# Patient Record
Sex: FEMALE | Race: OTHER | NOT HISPANIC OR LATINO
[De-identification: names, ages, dates, MRNs, and addresses within clinical notes are randomized per-mention and may not be internally consistent; named-entity substitution may affect disease eponyms.]

---

## 2022-02-28 ENCOUNTER — FORM ENCOUNTER (OUTPATIENT)
Age: 27
End: 2022-02-28

## 2022-04-14 PROBLEM — Z00.00 ENCOUNTER FOR PREVENTIVE HEALTH EXAMINATION: Status: ACTIVE | Noted: 2022-04-14

## 2022-05-11 ENCOUNTER — APPOINTMENT (OUTPATIENT)
Age: 27
End: 2022-05-11

## 2022-05-11 ENCOUNTER — APPOINTMENT (OUTPATIENT)
Dept: HEMATOLOGY ONCOLOGY | Facility: CLINIC | Age: 27
End: 2022-05-11

## 2022-05-11 ENCOUNTER — NON-APPOINTMENT (OUTPATIENT)
Age: 27
End: 2022-05-11

## 2022-05-18 ENCOUNTER — FORM ENCOUNTER (OUTPATIENT)
Age: 27
End: 2022-05-18

## 2022-06-09 ENCOUNTER — APPOINTMENT (OUTPATIENT)
Dept: HEMATOLOGY ONCOLOGY | Facility: CLINIC | Age: 27
End: 2022-06-09

## 2022-06-13 ENCOUNTER — NON-APPOINTMENT (OUTPATIENT)
Age: 27
End: 2022-06-13

## 2022-06-13 NOTE — DISCUSSION/SUMMARY
[FreeTextEntry1] : The visit was provided via telehealth using real-time 2-way audio visual technology. The patient, Mitzi Mitchell, was located in [place], [place] at the time of the visit. The provider, Mirian Black (genetic counselor) participated in the telehealth encounter. The genetic counselor was located at the medical office at [facility] in [place], [place] at the time of the appointment. Verbal consent for telehealth services was given by the patient. The patient was unaccompanied.\par \par REASON FOR VISIT:\par Ms. Mitzi Mitchell is a 27-year-old female who was seen for cancer genetic counseling and risk assessment due to a positive pathogenic  The patient was seen on 2022 through the [facility] at [facility]. The patient was unaccompanied. \par \par RELEVANT MEDICAL AND SURGICAL HISTORY:\par The patient has no personal history of cancer. Patient reported she was bleeding abnormally and was offered testing.\par \par OTHER MEDICAL AND SURGICAL HISTORY:\par • None \par \par PAST OB/GYN HISTORY:\par Obstetrical History:  (1 TOP)\par Age at Menarche: 14\par Pre-Menopausal: irregular\par Age at First Live Birth: N/A\par Oral Contraceptive Use: For a few months at age 18\par Hormone Replacement Therapy: None\par \par CANCER SCREENING HISTORY: \par BREAST: Clinical breast exam a few months ago.\par GYN: Last visit was a few months ago. Frequency: annual. Patient reported no abnormalities.\par Colon: None.\par Skin: None.\par \par SOCIAL HISTORY:\par • Tobacco-product use: None. \par \par FAMILY HISTORY:\par Paternal ancestry was reported as Serbian/Sindy and maternal ancestry was reported as Ivorian/AJ. A detailed family history of cancer was ascertained, see below for pedigree. Of note:\par • Mother with breast cancer at 52\par \par No known FHx of colorectal or uterine cancer\par \par The remaining family history is unremarkable. According to the patient there are no other known cases of significant cancers in the family. To her knowledge no one else in the family has had germline testing for cancer susceptibility. \par \par TEST RESULTS: POSITIVE \par This patient was tested by Dr. 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 Velázquez Syndrome is an autosomal dominant hereditary cancer predisposition syndrome associated with increased risks of primarily colorectal and endometrial cancer as well as additional other cancers. \par \par Colorectal cancer risk:\par The risk to develop colorectal cancer to age 80 for individuals with a pathogenic PMS2 mutation is estimated to be approximately 8.7-20% compared to the general population risk of approximately 4.2%, with a mean age of onset of 61-66 years\par 	\par Endometrial cancer risk:\par The risk to develop endometrial cancer is estimated to be approximately 13-26% compared to the general population risk of 3.1%, with a mean age of onset of 49-50 years.\par \par Ovarian cancer risk:\par The risk to develop ovarian cancer is estimated to be approximately 3% compared to the general population risk of 1.3%, with a mean age of onset of 51-59 years\par \par Other cancer risks:\par Increased risks of gastric, pancreatic, bladder, biliary tract, urothelial, small bowel, prostate, brain/CNS, and breast cancer, and skin neoplasms/lesions (i.e. sebaceous adenoma, sebaceous carcinomas, and keratoacanthomas) are within the LS spectrum, however studies specific to LS have not reported increased cumulative risks for PMS2 carriers at this time.\par \par PATIENT MANAGEMENT IMPLICATIONS:\par The consult was initially intended to be a collaborative appointment with the patient, genetic counselor and Dr. Vick Ruiz, however, due to time restraint, the appointment was only able to be partially completed with the patient and the genetic counselor. We reviewed the general screening recommendations for patients with PMS2-related Velázquez syndrome. A follow-up collaborative appointment will be rescheduled with the patient, Dr. Ruiz and the genetic counselor where the recommendations for this patient will be discussed and a screening plan will be created.\par \par REPRODUCTIVE RISKS:\par We also discussed the reproductive risks associated with pathogenic PMS2 mutations. Please note, individuals with biallelic (two) mutations in the PMS2 gene have been reported to have Constitutional Mismatch Repair Deficiency (CMMRD). CMMRD is an autosomal recessive condition characterized by early-onset cancer, usually in childhood or young adult. The most common cancers are brain/CNS tumors, gastrointestinal malignancies, and hematological malignancies. For those of reproductive age, we recommend the reproductive partner of an individual who is a PMS2 carrier also have PMS2 genetic testing to assess the risk of having a child affected with CMMRD. If both individuals are carriers of PMS2 mutations, there is a 25% chance to have a child with CMMRD for each pregnancy.\par \par Since the genetic mutation is known, pre-implantation genetic diagnosis (PGD) is possible. PGD and prenatal diagnosis were discussed briefly. The patient was provided the contact information for the reproductive genetic counselor and the fertility clinic to discuss their options.\par \par IMPLICATIONS FOR FAMILY MEMBERS:\par This mutation is inherited in an autosomal dominant pattern. We recommend the patient’s relatives, specifically her daughter, mother and sister pursue genetic counseling and genetic testing as there is a chance they also have the same mutation. The patient was made aware that if any at-risk relatives wanted to pursue genetic testing any time in the future, we would be happy to see them and coordinate testing. \par \par VARIANT OF UNCERTAIN SIGNIFICANCE:\par We also discussed the patient's uncertain result in the GALNT12 gene. At this time the available evidence is insufficient to determine the role of this variant in disease and the clinical significance of these results are uncertain. The GALNT12 gene currently has no well-established disease association; however, there is preliminary evidence supporting a correlation with autosomal dominant predisposition to colorectal cancer. \par \par The detection of this VUS does NOT currently change the patient’s medical management. It is NOT recommended at this time that family members use this result for predictive genetic testing or medical management decisions. With more research, a VUS may be reclassified as either disease-causing or benign. The patient was encouraged to contact us every 2-3 years to enquire about any new information for this variant, or sooner if there are any changes in her personal or family history of cancer.  Such updates could possibly change our risk assessment and recommendations. \par \par SUMMARY:\par We informed the patient that our knowledge of genetics and inherited cancer conditions is changing rapidly. Therefore, we recommended that the patient contact our office, every 2 to 3 years, to discuss relevant advances in cancer genetics.  We emphasized the importance of re-contacting us with updates regarding her personal and family history of cancer as well as any updates regarding additional cancer genetic test results performed for the patient and/or family members.  Such updates could possibly change our risk assessment and recommendations. \par \par PLAN:\par The consult was initially intended to be a collaborative appointment with the patient, genetic counselor and Dr. Vick Ruiz, however, due to time restraint, the appointment was only able to be partially completed with the patient and the genetic counselor. We reviewed the general screening recommendations for patients with PMS2-related Velázquez syndrome. A follow-up collaborative appointment will be rescheduled with the patient, Dr. Ruiz and the genetic counselor where the recommendations for this patient will be discussed and a screening plan will be created.\par \par For any additional questions please call Cancer Genetics at [phone] or [phone].\par \par \par Mirian Black MS, [facility]\par Genetic Counselor, Cancer Genetics\par \par  \par

## 2022-06-29 ENCOUNTER — APPOINTMENT (OUTPATIENT)
Dept: HEMATOLOGY ONCOLOGY | Facility: CLINIC | Age: 27
End: 2022-06-29
Payer: COMMERCIAL

## 2022-06-29 PROCEDURE — 99205 OFFICE O/P NEW HI 60 MIN: CPT | Mod: 95

## 2022-07-13 NOTE — ASSESSMENT
[FreeTextEntry1] : The visit was provided via telehealth using real-time 2-way audio visual technology. The patient, iMtzi Mitchell, was located in [place], [place], at the time of the visit. The physician, Dr. Vick Ruiz, was located in [place], [place] at the time of the visit. The genetic counselor, Mirian Black, also participated in the telehealth encounter and was located in [place], [place] at the time of the appointment. Verbal consent for telehealth services was given on 2022 by the patient. \par \par REASON FOR VISIT:\par Ms. Mitzi Mitchell is a 27-year-old female who was seen for cancer genetic counseling and risk assessment due to a pathogenic PMS2 mutation being identified on genetic testing ordered by by Dr. Niki Jack on 3/1/202 . The patient was initially seen on 2022 through the [facility] at [facility]. However due to time constraints, the patient was only able to see the genetic counselor, Mirian Black, and not the physician. The patient represents today to meet with Dr. Ruiz.  The patient was unaccompanied to today's session. \par \par RELEVANT MEDICAL AND SURGICAL HISTORY:\par The patient has no personal history of cancer. \par \par OTHER MEDICAL AND SURGICAL HISTORY:\par • None \par \par PAST OB/GYN HISTORY:\par Obstetrical History:  (1 TOP)\par Age at Menarche: 14\par Pre-Menopausal: irregular\par Age at First Live Birth: N/A\par Oral Contraceptive Use: For a few months at age 18\par Hormone Replacement Therapy: None\par \par CANCER SCREENING HISTORY: \par BREAST: Clinical breast exam a few months ago.\par GYN: Last visit was a few months ago. Frequency: annual. Patient reported no abnormalities.\par Colon: None.\par Skin: None.\par \par SOCIAL HISTORY:\par • Tobacco-product use: None. \par \par FAMILY HISTORY:\par Paternal ancestry was reported as Kazakh/Occitan and maternal ancestry was reported as Azeri/AJ. A detailed family history of cancer was ascertained, see below for pedigree. Of note:\par • Mother with breast cancer at 52\par \par No known FHx of colorectal or uterine cancer\par \par The remaining family history is unremarkable. According to the patient there are no other known cases of significant cancers in the family. To her knowledge no one else in the family has had germline testing for cancer susceptibility. \par \par TEST RESULTS: POSITIVE \par This patient was tested by Dr. 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 Velázquez Syndrome is an autosomal dominant hereditary cancer predisposition syndrome associated with increased risks of primarily colorectal and endometrial cancer as well as additional other cancers. \par \par Colorectal cancer risk:\par The risk to develop colorectal cancer to age 80 for individuals with a pathogenic PMS2 mutation is estimated to be approximately 8.7-20% compared to the general population risk of approximately 4.2%, with a mean age of onset of 61-66 years\par 	\par Endometrial cancer risk:\par The risk to develop endometrial cancer is estimated to be approximately 13-26% compared to the general population risk of 3.1%, with a mean age of onset of 49-50 years.\par \par Ovarian cancer risk:\par The risk to develop ovarian cancer is estimated to be up to 3% compared to the general population risk of 1.3%, with a mean age of onset of 51-59 years\par \par Other cancer risks:\par Increased risks of gastric, pancreatic, bladder, biliary tract, urothelial, small bowel, prostate, brain/CNS, and breast cancer, and skin neoplasms/lesions (i.e. sebaceous adenoma, sebaceous carcinomas, and keratoacanthomas) are within the LS spectrum, however studies specific to LS have not reported increased cumulative risks for PMS2 carriers at this time.\par \par PATIENT MANAGEMENT IMPLICATIONS:\par Given The patient’s personal and current reported family history of cancer, and her PMS2 positive genetic test results, the following screening guidelines and risk-reducing recommendations were discussed:\par \par Colon:\par • It was recommended The patient undergo high quality colonoscopy every 1-3 years beginning at age 30-35 or 2-5 years prior to the earliest diagnosis of colon cancer in the family. \par • We also discussed there is some data demonstrating that the use of aspirin may decrease colorectal cancer risk in individuals with LS. Optimal dose and duration of use of aspirin for colon cancer prevention in LS is still being investigated. We suggested The patient discuss the option of taking aspirin with her gastroenterologist. \par \par Endometrial:\par • Hysterectomy may reduce the incidence of endometrial cancer, therefore hysterectomy was discussed as a reasonable risk-reducing option for The patient in the future. \par • Endometrial biopsy every 1-2 years starting at age 30-35 can be considered for patients declining or delaying hysterectomy. \par • Individuals with LS should keep track of their menstrual cycle and should promptly report any abnormal bleeding to their treating physician. \par • The patient reported she was having abnormal menses occurring approximately twice a month after she underwent a termination of pregnancy. Her gynecologist included genetic testing as part of the work-up for the abnormal menses. The patient stated her periods are more regular now, however, she does not have a history of regular periods. We recommended following-up with her gynecologist regularly to monitor her menses. \par \par Ovarian:\par • Insufficient evidence exists to make a specific recommendation for risk-reducing salpingo-oophorectomy (RRSO) for PMS2 pathogenic variant carriers. \par • Data do not support routine ovarian cancer screening for Velázquez syndrome, but may be considered at the clinician's discretion. \par • Individuals should report any abnormal symptoms associated with ovarian cancer, such as pelvic or abdominal pain, bloating, increased abdominal girth, difficulty earing, early satiety, or urinary frequency or urgency. Symptoms that persist for several weeks and are a change from a woman's baseline should prompt evaluation by her physician.\par \par Other:\par In the absence of other indications,  should practice age-appropriate cancer screening of other organ systems as recommended for the general population. \par \par REPRODUCTIVE RISKS:\par We also discussed the reproductive risks associated with pathogenic PMS2 mutations. Please note, individuals with biallelic (two) mutations in the PMS2 gene have been reported to have Constitutional Mismatch Repair Deficiency (CMMRD). CMMRD is an autosomal recessive condition characterized by early-onset cancer, usually in childhood or young adult. The most common cancers are brain/CNS tumors, gastrointestinal malignancies, and hematological malignancies. For those of reproductive age, we recommend the reproductive partner of an individual who is a PMS2 carrier also have PMS2 genetic testing to assess the risk of having a child affected with CMMRD. If both individuals are carriers of PMS2 mutations, there is a 25% chance to have a child with CMMRD for each pregnancy.\par \par Since the genetic mutation is known, pre-implantation genetic diagnosis (PGD) is possible. PGD and prenatal diagnosis were discussed briefly. The patient was provided the contact information for the reproductive genetic counselor and the fertility clinic to discuss their options.\par \par IMPLICATIONS FOR FAMILY MEMBERS:\par This mutation is inherited in an autosomal dominant pattern. We recommend the patient’s relatives, specifically her daughter, mother and sister pursue genetic counseling and genetic testing as there is a chance they also have the same mutation. The patient was made aware that if any at-risk relatives wanted to pursue genetic testing any time in the future, we would be happy to see them and coordinate testing. \par \par VARIANT OF UNCERTAIN SIGNIFICANCE:\par We also discussed the patient's uncertain result in the GALNT12 gene. At this time the available evidence is insufficient to determine the role of this variant in disease and the clinical significance of these results are uncertain. The GALNT12 gene currently has no well-established disease association; however, there is preliminary evidence supporting a correlation with autosomal dominant predisposition to colorectal cancer. \par \par The detection of this VUS should NOT currently change the patient’s medical management. It is NOT recommended at this time that family members use this result for predictive genetic testing or medical management decisions. With more research, a VUS may be reclassified as either disease-causing or benign. The patient was encouraged to contact us every 2-3 years to enquire about any new information for this variant, or sooner if there are any changes in her personal or family history of cancer. Such updates could possibly change our risk assessment and recommendations. \par \par SUMMARY:\par We informed the patient that our knowledge of genetics and inherited cancer conditions is changing rapidly. Therefore, we recommended that the patient contact our office, every 2 to 3 years, to discuss relevant advances in cancer genetics. We emphasized the importance of re-contacting us with updates regarding her personal and family history of cancer as well as any updates regarding additional cancer genetic test results performed for the patient and/or family members. Such updates could possibly change our risk assessment and recommendations. \par \par PLAN:\par 1. See screening and management recommendations above.\par 2. A copy of the genetic test results were given to The patient.\par 3. The patient was encouraged to contact us every 2-3 years to discuss relevant advances in cancer genetics, or sooner if there are any changes in her personal or family history of cancer.\par \par \par For any additional questions please call Cancer Genetics at [phone] or [phone].\par \par \par Mirian Black MS, [facility]\par Genetic Counselor, Cancer Genetics\par \par  \par Attending Attestation:\par \par I have reviewed and edited the genetic counselor's note and I agree with the assessment and plan as documented. I spent approximately 60 minutes in total time of which approximately 35-40 minutes was face-to-face (via 2-way audiovisual telemedicine connection) with Ms. Mitchell reviewing her relevant personal and family history, the genetic testing results, our risk-assessment, and options for future cancer risk-reduction for the patient and her relevant family members. Over half this time was spent in counseling and coordination of care.\par \par Vick Ruiz MD\par Chief, Cancer Genetics\par [facility] [place]\par \par \par

## 2023-11-15 ENCOUNTER — APPOINTMENT (OUTPATIENT)
Dept: ENDOCRINOLOGY | Facility: CLINIC | Age: 28
End: 2023-11-15